# Patient Record
Sex: FEMALE | Race: OTHER | Employment: FULL TIME | URBAN - METROPOLITAN AREA
[De-identification: names, ages, dates, MRNs, and addresses within clinical notes are randomized per-mention and may not be internally consistent; named-entity substitution may affect disease eponyms.]

---

## 2017-01-18 ENCOUNTER — HOSPITAL ENCOUNTER (OUTPATIENT)
Dept: ULTRASOUND IMAGING | Age: 37
Discharge: HOME | End: 2017-01-18
Attending: FAMILY MEDICINE
Payer: COMMERCIAL

## 2017-01-18 DIAGNOSIS — R93.89 ENDOMETRIAL THICKENING ON ULTRA SOUND: ICD-10-CM

## 2017-01-18 PROCEDURE — 76830 TRANSVAGINAL US NON-OB: CPT

## 2018-11-29 PROBLEM — Z23 ENCOUNTER FOR IMMUNIZATION: Status: ACTIVE | Noted: 2018-11-29

## 2019-09-23 PROBLEM — Z23 ENCOUNTER FOR IMMUNIZATION: Status: RESOLVED | Noted: 2018-11-29 | Resolved: 2019-09-23

## 2020-02-08 ENCOUNTER — HOSPITAL ENCOUNTER (OUTPATIENT)
Dept: MAMMOGRAPHY | Age: 40
Discharge: HOME | End: 2020-02-08
Attending: FAMILY MEDICINE
Payer: COMMERCIAL

## 2020-02-08 DIAGNOSIS — Z12.31 SCREENING MAMMOGRAM FOR HIGH-RISK PATIENT: ICD-10-CM

## 2020-02-08 PROCEDURE — 77067 SCR MAMMO BI INCL CAD: CPT

## 2020-08-17 ENCOUNTER — HOSPITAL ENCOUNTER (OUTPATIENT)
Dept: ULTRASOUND IMAGING | Age: 40
Discharge: HOME | End: 2020-08-17
Attending: FAMILY MEDICINE
Payer: COMMERCIAL

## 2020-08-17 ENCOUNTER — HOSPITAL ENCOUNTER (OUTPATIENT)
Dept: NUCLEAR MEDICINE | Age: 40
Discharge: HOME | End: 2020-08-17
Attending: FAMILY MEDICINE
Payer: COMMERCIAL

## 2020-08-17 VITALS — BODY MASS INDEX: 23.62 KG/M2 | WEIGHT: 125 LBS

## 2020-08-17 DIAGNOSIS — R10.13 EPIGASTRIC PAIN: ICD-10-CM

## 2020-08-17 PROCEDURE — 74011250636 HC RX REV CODE- 250/636: Performed by: FAMILY MEDICINE

## 2020-08-17 PROCEDURE — 78227 HEPATOBIL SYST IMAGE W/DRUG: CPT

## 2020-08-17 PROCEDURE — 76700 US EXAM ABDOM COMPLETE: CPT

## 2020-08-17 RX ORDER — SODIUM CHLORIDE 0.9 % (FLUSH) 0.9 %
10 SYRINGE (ML) INJECTION
Status: COMPLETED | OUTPATIENT
Start: 2020-08-17 | End: 2020-08-17

## 2020-08-17 RX ADMIN — SINCALIDE 1.13 MCG: 5 INJECTION, POWDER, LYOPHILIZED, FOR SOLUTION INTRAVENOUS at 12:00

## 2020-08-17 RX ADMIN — Medication 10 ML: at 11:10

## 2021-03-18 ENCOUNTER — TRANSCRIBE ORDER (OUTPATIENT)
Dept: SCHEDULING | Age: 41
End: 2021-03-18

## 2021-03-18 DIAGNOSIS — Z12.31 ENCOUNTER FOR SCREENING MAMMOGRAM FOR MALIGNANT NEOPLASM OF BREAST: Primary | ICD-10-CM

## 2021-03-20 ENCOUNTER — HOSPITAL ENCOUNTER (OUTPATIENT)
Dept: MAMMOGRAPHY | Age: 41
Discharge: HOME | End: 2021-03-20
Attending: FAMILY MEDICINE
Payer: COMMERCIAL

## 2021-03-20 DIAGNOSIS — Z12.31 ENCOUNTER FOR SCREENING MAMMOGRAM FOR MALIGNANT NEOPLASM OF BREAST: ICD-10-CM

## 2021-03-20 PROCEDURE — 77067 SCR MAMMO BI INCL CAD: CPT

## 2022-01-06 ENCOUNTER — HOSPITAL ENCOUNTER (OUTPATIENT)
Dept: ULTRASOUND IMAGING | Age: 42
Discharge: HOME | End: 2022-01-06
Attending: FAMILY MEDICINE
Payer: COMMERCIAL

## 2022-01-06 ENCOUNTER — HOSPITAL ENCOUNTER (OUTPATIENT)
Dept: CT IMAGING | Age: 42
Discharge: HOME | End: 2022-01-06
Attending: FAMILY MEDICINE
Payer: COMMERCIAL

## 2022-01-06 DIAGNOSIS — R31.9 HEMATURIA, UNSPECIFIED TYPE: ICD-10-CM

## 2022-01-06 PROCEDURE — 74176 CT ABD & PELVIS W/O CONTRAST: CPT

## 2022-01-06 PROCEDURE — 76770 US EXAM ABDO BACK WALL COMP: CPT

## 2022-04-26 ENCOUNTER — TRANSCRIBE ORDER (OUTPATIENT)
Dept: SCHEDULING | Age: 42
End: 2022-04-26

## 2022-04-26 DIAGNOSIS — Z12.31 VISIT FOR SCREENING MAMMOGRAM: Primary | ICD-10-CM

## 2022-06-19 DIAGNOSIS — N93.8 OTHER SPECIFIED ABNORMAL UTERINE AND VAGINAL BLEEDING: ICD-10-CM

## 2022-06-20 RX ORDER — NORETHINDRONE ACETATE AND ETHINYL ESTRADIOL AND FERROUS FUMARATE 1MG-20(21)
KIT ORAL
Qty: 84 TABLET | Refills: 3 | OUTPATIENT
Start: 2022-06-20

## 2022-11-22 ENCOUNTER — NURSE ONLY (OUTPATIENT)
Dept: FAMILY MEDICINE CLINIC | Facility: CLINIC | Age: 42
End: 2022-11-22
Payer: COMMERCIAL

## 2022-11-22 DIAGNOSIS — E03.9 ACQUIRED HYPOTHYROIDISM: ICD-10-CM

## 2022-11-22 DIAGNOSIS — E78.00 PURE HYPERCHOLESTEROLEMIA: ICD-10-CM

## 2022-11-22 DIAGNOSIS — E55.9 VITAMIN D DEFICIENCY: ICD-10-CM

## 2022-11-22 DIAGNOSIS — Z00.00 LABORATORY EXAMINATION ORDERED AS PART OF A ROUTINE GENERAL MEDICAL EXAMINATION: Primary | ICD-10-CM

## 2022-11-22 DIAGNOSIS — Z11.59 NEED FOR HEPATITIS C SCREENING TEST: ICD-10-CM

## 2022-11-22 LAB
25(OH)D3 SERPL-MCNC: 18.7 NG/ML (ref 30–100)
ALBUMIN SERPL-MCNC: 3.7 G/DL (ref 3.5–5)
ALBUMIN/GLOB SERPL: 1.3 {RATIO} (ref 0.4–1.6)
ALP SERPL-CCNC: 73 U/L (ref 50–136)
ALT SERPL-CCNC: 25 U/L (ref 12–65)
ANION GAP SERPL CALC-SCNC: 5 MMOL/L (ref 2–11)
AST SERPL-CCNC: 11 U/L (ref 15–37)
BILIRUB SERPL-MCNC: 0.6 MG/DL (ref 0.2–1.1)
BILIRUBIN, URINE, POC: NEGATIVE
BLOOD URINE, POC: ABNORMAL
BUN SERPL-MCNC: 12 MG/DL (ref 6–23)
CALCIUM SERPL-MCNC: 8.7 MG/DL (ref 8.3–10.4)
CHLORIDE SERPL-SCNC: 112 MMOL/L (ref 101–110)
CHOLEST SERPL-MCNC: 150 MG/DL
CO2 SERPL-SCNC: 25 MMOL/L (ref 21–32)
CREAT SERPL-MCNC: 0.8 MG/DL (ref 0.6–1)
GLOBULIN SER CALC-MCNC: 2.9 G/DL (ref 2.8–4.5)
GLUCOSE SERPL-MCNC: 99 MG/DL (ref 65–100)
GLUCOSE URINE, POC: NEGATIVE
GRANS ABSOLUTE, POC: 4.8 K/UL
GRANULOCYTES %, POC: 59.7 %
HCV AB SER QL: NONREACTIVE
HDLC SERPL-MCNC: 55 MG/DL (ref 40–60)
HDLC SERPL: 2.7 {RATIO}
HEMATOCRIT, POC: 44.4 %
HEMOGLOBIN, POC: 14 G/DL
KETONES, URINE, POC: NEGATIVE
LDLC SERPL CALC-MCNC: 75.4 MG/DL
LEUKOCYTE ESTERASE, URINE, POC: NEGATIVE
LYMPHOCYTE %, POC: 35.2 %
LYMPHS ABSOLUTE, POC: 2.9 K/UL
MCH, POC: 32 PG (ref 40–?)
MCHC, POC: 31.5
MCV, POC: 97
MONOCYTE %, POC: 5.1 %
MONOCYTE, ABSOLUTE POC: 0.4 K/UL
MPV, POC: 8.6 FL
NITRITE, URINE, POC: NEGATIVE
PH, URINE, POC: 5.5 (ref 4.6–8)
PLATELET COUNT, POC: 248 K/UL
POTASSIUM SERPL-SCNC: 4.2 MMOL/L (ref 3.5–5.1)
PROT SERPL-MCNC: 6.6 G/DL (ref 6.3–8.2)
PROTEIN,URINE, POC: NEGATIVE
RBC, POC: 4.37 M/UL
RDW, POC: 11.8 %
SODIUM SERPL-SCNC: 142 MMOL/L (ref 133–143)
SPECIFIC GRAVITY, URINE, POC: 1.02 (ref 1–1.03)
TRIGL SERPL-MCNC: 98 MG/DL (ref 35–150)
TSH, 3RD GENERATION: 3.35 UIU/ML (ref 0.36–3.74)
URINALYSIS CLARITY, POC: ABNORMAL
URINALYSIS COLOR, POC: YELLOW
UROBILINOGEN, POC: NORMAL
VLDLC SERPL CALC-MCNC: 19.6 MG/DL (ref 6–23)
WBC, POC: 8.1 K/UL

## 2022-11-22 PROCEDURE — 81002 URINALYSIS NONAUTO W/O SCOPE: CPT | Performed by: FAMILY MEDICINE

## 2022-11-22 PROCEDURE — 85025 COMPLETE CBC W/AUTO DIFF WBC: CPT | Performed by: FAMILY MEDICINE

## 2022-11-23 LAB
HIV 1+2 AB+HIV1 P24 AG SERPL QL IA: NONREACTIVE
HIV 1/2 RESULT COMMENT: NORMAL

## 2022-12-02 ENCOUNTER — OFFICE VISIT (OUTPATIENT)
Dept: FAMILY MEDICINE CLINIC | Facility: CLINIC | Age: 42
End: 2022-12-02
Payer: COMMERCIAL

## 2022-12-02 VITALS
HEIGHT: 61 IN | WEIGHT: 128 LBS | BODY MASS INDEX: 24.17 KG/M2 | SYSTOLIC BLOOD PRESSURE: 120 MMHG | DIASTOLIC BLOOD PRESSURE: 70 MMHG

## 2022-12-02 DIAGNOSIS — Z00.00 ROUTINE GENERAL MEDICAL EXAMINATION AT A HEALTH CARE FACILITY: Primary | ICD-10-CM

## 2022-12-02 DIAGNOSIS — M19.90 ARTHRITIS: ICD-10-CM

## 2022-12-02 DIAGNOSIS — R31.9 HEMATURIA, UNSPECIFIED TYPE: ICD-10-CM

## 2022-12-02 DIAGNOSIS — E03.9 ACQUIRED HYPOTHYROIDISM: ICD-10-CM

## 2022-12-02 DIAGNOSIS — Z12.4 PAP SMEAR FOR CERVICAL CANCER SCREENING: ICD-10-CM

## 2022-12-02 DIAGNOSIS — Z13.31 SCREENING FOR DEPRESSION: ICD-10-CM

## 2022-12-02 DIAGNOSIS — E78.00 PURE HYPERCHOLESTEROLEMIA: ICD-10-CM

## 2022-12-02 LAB
CRP SERPL-MCNC: <0.3 MG/DL (ref 0–0.9)
ERYTHROCYTE [SEDIMENTATION RATE] IN BLOOD: 20 MM/HR (ref 0–20)
URATE SERPL-MCNC: 4.3 MG/DL (ref 2.6–6)

## 2022-12-02 PROCEDURE — 99396 PREV VISIT EST AGE 40-64: CPT | Performed by: FAMILY MEDICINE

## 2022-12-02 RX ORDER — ATORVASTATIN CALCIUM 20 MG/1
20 TABLET, FILM COATED ORAL DAILY
Qty: 90 TABLET | Refills: 3 | Status: SHIPPED | OUTPATIENT
Start: 2022-12-02

## 2022-12-02 RX ORDER — LEVOTHYROXINE SODIUM 0.07 MG/1
75 TABLET ORAL
Qty: 90 TABLET | Refills: 3 | Status: SHIPPED | OUTPATIENT
Start: 2022-12-02

## 2022-12-02 RX ORDER — CELECOXIB 200 MG/1
200 CAPSULE ORAL DAILY
Qty: 90 CAPSULE | Refills: 1 | Status: SHIPPED | OUTPATIENT
Start: 2022-12-02

## 2022-12-02 ASSESSMENT — PATIENT HEALTH QUESTIONNAIRE - PHQ9
SUM OF ALL RESPONSES TO PHQ QUESTIONS 1-9: 0
1. LITTLE INTEREST OR PLEASURE IN DOING THINGS: 0
SUM OF ALL RESPONSES TO PHQ QUESTIONS 1-9: 0
2. FEELING DOWN, DEPRESSED OR HOPELESS: 0
SUM OF ALL RESPONSES TO PHQ9 QUESTIONS 1 & 2: 0

## 2022-12-02 ASSESSMENT — ENCOUNTER SYMPTOMS
SHORTNESS OF BREATH: 0
NAUSEA: 0
VOMITING: 0

## 2022-12-02 NOTE — PROGRESS NOTES
PROGRESS NOTE    SUBJECTIVE:   Amairani Leslie is a 38 y.o. female seen for a follow up visit regarding the following chief complaint:     Chief Complaint   Patient presents with    Annual Exam    Discuss Labs           HPI patient presents to the office today for complete physical complaining that she hurts in every joint in her body and wants to know if she has arthritis also never followed up with urology for her work-up of hematuria      Past Medical History, Past Surgical History, Family history, Social History, and Medications were all reviewed with the patient today and updated as necessary. Current Outpatient Medications   Medication Sig Dispense Refill    atorvastatin (LIPITOR) 20 MG tablet Take 1 tablet by mouth daily 90 tablet 3    levothyroxine (SYNTHROID) 75 MCG tablet Take 1 tablet by mouth every morning (before breakfast) 90 tablet 3    celecoxib (CELEBREX) 200 MG capsule Take 1 capsule by mouth daily With food for pain 90 capsule 1    fluticasone (FLONASE) 50 MCG/ACT nasal spray INHALE 2 SPRAYS INTO EACH NOSTRIL EVERY DAY FOR 30 DAYS      meclizine (ANTIVERT) 25 MG tablet 1 p.o. twice daily as needed vertigo and at at bedtime with vertigo exercises Indications: sensation of spinning or whirling       No current facility-administered medications for this visit.      No Known Allergies  Patient Active Problem List   Diagnosis    Vitamin D deficiency    Smoking    Hyperlipidemia    Environmental allergies     Past Medical History:   Diagnosis Date    Environmental allergies 2013    Hyperlipidemia 3/22/2013    Smoking 2013    Vitamin D deficiency 3/22/2013     Past Surgical History:   Procedure Laterality Date    APPENDECTOMY      BREAST REDUCTION SURGERY      2018     SECTION      IMPLANT BREAST SILICONE/EQ      9332     Family History   Problem Relation Age of Onset    Breast Cancer Maternal Aunt     No Known Problems Father     Cancer Maternal Grandmother breast cancer    Breast Cancer Maternal Grandmother     Cancer Mother         uterus cancer     Social History     Tobacco Use    Smoking status: Former     Packs/day: 0.25     Years: 20.00     Pack years: 5.00     Types: Cigarettes     Start date: 2008     Quit date: 2019     Years since quittin.9     Passive exposure: Past    Smokeless tobacco: Never    Tobacco comments:     Quit smoking: Down to 1 Cig here and there 5.21.18   Substance Use Topics    Alcohol use: No         Review of Systems   Constitutional:  Negative for chills and fever. Respiratory:  Negative for shortness of breath. Cardiovascular:  Negative for chest pain. Gastrointestinal:  Negative for nausea and vomiting. Endocrine: Negative for cold intolerance and heat intolerance. Genitourinary:  Negative for difficulty urinating, frequency, hematuria, menstrual problem, pelvic pain, urgency, vaginal bleeding, vaginal discharge and vaginal pain. Skin:  Negative for rash. Neurological:  Negative for dizziness and headaches. Psychiatric/Behavioral: Negative. OBJECTIVE:  /70 (Site: Left Upper Arm, Position: Sitting, Cuff Size: Small Adult)   Ht 5' 1\" (1.549 m)   Wt 128 lb (58.1 kg)   LMP 2022   BMI 24.19 kg/m²      Physical Exam  Vitals and nursing note reviewed. Exam conducted with a chaperone present. Constitutional:       Appearance: Normal appearance. HENT:      Head: Normocephalic and atraumatic. Right Ear: Tympanic membrane normal.      Left Ear: Tympanic membrane normal.      Nose: Nose normal.      Mouth/Throat:      Mouth: Mucous membranes are moist.      Pharynx: No oropharyngeal exudate or posterior oropharyngeal erythema. Eyes:      Extraocular Movements: Extraocular movements intact. Conjunctiva/sclera: Conjunctivae normal.      Pupils: Pupils are equal, round, and reactive to light. Cardiovascular:      Rate and Rhythm: Normal rate and regular rhythm.       Pulses: Normal pulses. Heart sounds: Normal heart sounds. Pulmonary:      Effort: Pulmonary effort is normal.      Breath sounds: Normal breath sounds. Abdominal:      General: Abdomen is flat. Bowel sounds are normal.      Palpations: Abdomen is soft. Hernia: There is no hernia in the left inguinal area or right inguinal area. Genitourinary:     General: Normal vulva. Urethra: No urethral lesion. Vagina: Normal.      Cervix: Normal.      Uterus: Normal.       Adnexa: Right adnexa normal and left adnexa normal.      Rectum: Normal. Guaiac result negative. No mass. Musculoskeletal:         General: Normal range of motion. Cervical back: Normal range of motion and neck supple. Skin:     General: Skin is warm and dry. Capillary Refill: Capillary refill takes less than 2 seconds. Neurological:      General: No focal deficit present. Mental Status: She is alert and oriented to person, place, and time. Psychiatric:         Mood and Affect: Mood normal.         Behavior: Behavior normal.         Thought Content: Thought content normal.        Medical problems and test results were reviewed with the patient today.      Recent Results (from the past 672 hour(s))   AMB POC KTY COMPLETE CBC    Collection Time: 11/22/22  8:40 AM   Result Value Ref Range    WBC, POC 8.1 K/uL    Lymphocyte % 35.2 %    Monocyte % 5.1 %    Granulocytes %, POC 59.7 %    Lymphs Abs 2.9 K/uL    Monocyte Absolute, POC 0.4 K/uL    Granulocytes Abs 4.8 K/uL    RBC, POC 4.37 M/uL    Hemoglobin, POC 14.0 G/DL    Hematocrit, POC 44.4 %    MCV 97.0     MCH 32.0 (A) 40 pg    MCHC 31.5     RDW, POC 11.8 %    Platelet Count,  K/UL    MPV POC 8.6 fL   AMB POC URINALYSIS DIP STICK MANUAL W/O MICRO    Collection Time: 11/22/22  8:40 AM   Result Value Ref Range    Color (UA POC) Yellow     Clarity (UA POC) Slightly Cloudy     Glucose, Urine, POC Negative Negative    Bilirubin, Urine, POC Negative Negative    Ketones, Urine, POC Negative Negative    Specific Gravity, Urine, POC 1.025 1.001 - 1.035    Blood (UA POC) 2+ Negative    pH, Urine, POC 5.5 4.6 - 8.0    Protein, Urine, POC Negative Negative    Urobilinogen, POC Normal     Nitrite, Urine, POC Negative Negative    Leukocyte Esterase, Urine, POC Negative Negative   Vitamin D 25 Hydroxy    Collection Time: 11/22/22 10:18 AM   Result Value Ref Range    Vit D, 25-Hydroxy 18.7 (L) 30.0 - 100.0 ng/mL   TSH    Collection Time: 11/22/22 10:18 AM   Result Value Ref Range    TSH, 3RD GENERATION 3.350 0.358 - 3.740 uIU/mL   Lipid Panel    Collection Time: 11/22/22 10:18 AM   Result Value Ref Range    Cholesterol, Total 150 <200 MG/DL    Triglycerides 98 35 - 150 MG/DL    HDL 55 40 - 60 MG/DL    LDL Calculated 75.4 <100 MG/DL    VLDL Cholesterol Calculated 19.6 6.0 - 23.0 MG/DL    Chol/HDL Ratio 2.7     Comprehensive Metabolic Panel    Collection Time: 11/22/22 10:18 AM   Result Value Ref Range    Sodium 142 133 - 143 mmol/L    Potassium 4.2 3.5 - 5.1 mmol/L    Chloride 112 (H) 101 - 110 mmol/L    CO2 25 21 - 32 mmol/L    Anion Gap 5 2 - 11 mmol/L    Glucose 99 65 - 100 mg/dL    BUN 12 6 - 23 MG/DL    Creatinine 0.80 0.6 - 1.0 MG/DL    Est, Glom Filt Rate >60 >60 ml/min/1.73m2    Calcium 8.7 8.3 - 10.4 MG/DL    Total Bilirubin 0.6 0.2 - 1.1 MG/DL    ALT 25 12 - 65 U/L    AST 11 (L) 15 - 37 U/L    Alk Phosphatase 73 50 - 136 U/L    Total Protein 6.6 6.3 - 8.2 g/dL    Albumin 3.7 3.5 - 5.0 g/dL    Globulin 2.9 2.8 - 4.5 g/dL    Albumin/Globulin Ratio 1.3 0.4 - 1.6     HIV 1/2 Ag/Ab, 4TH Generation,W Rflx Confirm    Collection Time: 11/22/22 10:18 AM   Result Value Ref Range    HIV 1/2 Interp NONREACTIVE NONREACTIVE      HIV 1/2 Result Comment SEE NOTE     Hepatitis C Antibody    Collection Time: 11/22/22 10:18 AM   Result Value Ref Range    Hepatitis C Ab NONREACTIVE NONREACTIVE         ASSESSMENT and PLAN    Visit Diagnoses and Associated Orders       Routine general medical examination at a health care facility    -  Primary         Pap smear for cervical cancer screening        PAP IG, CT-NG, rfx Aptima HPV ASCUS (291115, 107736) [[id number] Custom]   - Future Order    PAP IG, CT-NG, rfx Aptima HPV ASCUS ([id number], [id number]) [[id number] Custom]           Pure hypercholesterolemia        atorvastatin (LIPITOR) 20 MG tablet [72755]           Acquired hypothyroidism        levothyroxine (SYNTHROID) 75 MCG tablet [4422]           Hematuria, unspecified type        [facility], [place] [[id number] Custom]           Arthritis        Sedimentation Rate [11239 Custom]   - Future Order    CCP Antibodies, IGG/IGA [33883 Custom]   - Future Order    ALESSANDRA, Direct, w/Reflex [30163 CPT(R)]   - Future Order    C-Reactive Protein [88594 Custom]   - Future Order    Uric Acid [00156 Custom]   - Future Order    Rheumatoid Factor [47996 Custom]   - Future Order    celecoxib (CELEBREX) 200 MG capsule [06747]      ALESSANDRA, Direct, w/Reflex [66941 CPT(R)]      CCP Antibodies, IGG/IGA [42908 Custom]      Rheumatoid Factor [20065 Custom]      Uric Acid [02552 Custom]      C-Reactive Protein [93219 Custom]      Sedimentation Rate [92392 Custom]           Screening for depression                         Diagnosis Orders   1. Routine general medical examination at a health care facility        2. Pap smear for cervical cancer screening  PAP IG, CT-NG, rfx Aptima HPV ASCUS ([id number], 264611)    PAP IG, CT-NG, rfx Aptima HPV ASCUS (888667, 679563)      3. Pure hypercholesterolemia  atorvastatin (LIPITOR) 20 MG tablet      4. Acquired hypothyroidism  levothyroxine (SYNTHROID) 75 MCG tablet      5. Hematuria, unspecified type  [facility], [place]      6.  Arthritis  Sedimentation Rate    CCP Antibodies, IGG/IGA    ALESSANDRA, Direct, w/Reflex    C-Reactive Protein    Uric Acid    Rheumatoid Factor    celecoxib (CELEBREX) 200 MG capsule    ALESSANDRA, Direct, w/Reflex    CCP Antibodies, IGG/IGA    Rheumatoid Factor    Uric Acid    C-Reactive Protein    Sedimentation Rate      7. Screening for depression        , Anam Powell was seen today for annual exam and discuss labs. Diagnoses and all orders for this visit:    Routine general medical examination at a health care facility    Pap smear for cervical cancer screening  -     PAP IG, CT-NG, rfx Aptima HPV ASCUS ([id number], [id number]); Future  -     PAP IG, CT-NG, rfx Aptima HPV ASCUS ([id number], [id number])    Pure hypercholesterolemia  -     atorvastatin (LIPITOR) 20 MG tablet; Take 1 tablet by mouth daily    Acquired hypothyroidism  -     levothyroxine (SYNTHROID) 75 MCG tablet; Take 1 tablet by mouth every morning (before breakfast)    Hematuria, unspecified type  -     [facility]    Arthritis  -     Sedimentation Rate; Future  -     CCP Antibodies, IGG/IGA; Future  -     ALESSANDRA, Direct, w/Reflex; Future  -     C-Reactive Protein; Future  -     Uric Acid; Future  -     Rheumatoid Factor; Future  -     celecoxib (CELEBREX) 200 MG capsule; Take 1 capsule by mouth daily With food for pain  -     ALESSANDRA, Direct, w/Reflex  -     CCP Antibodies, IGG/IGA  -     Rheumatoid Factor  -     Uric Acid  -     C-Reactive Protein  -     Sedimentation Rate    Screening for depression    , We will check an arthritis panel in the meantime start her on Celebrex we will refer her back to urology for finished work-up of hematuria reviewed the rest of her labs answered all her questions counseling and supportive care was normal physical exam we will call her back with the results of her Pap if they are abnormal.I have spent a total of 8-15 minutes assessing, reviewing, and discussing the depression screening with patient in office today.

## 2022-12-08 ENCOUNTER — TELEMEDICINE (OUTPATIENT)
Dept: FAMILY MEDICINE CLINIC | Facility: CLINIC | Age: 42
End: 2022-12-08
Payer: COMMERCIAL

## 2022-12-08 DIAGNOSIS — M19.90 ARTHRITIS: Primary | ICD-10-CM

## 2022-12-08 PROCEDURE — 99442 PR PHYS/QHP TELEPHONE EVALUATION 11-20 MIN: CPT | Performed by: FAMILY MEDICINE

## 2022-12-08 ASSESSMENT — PATIENT HEALTH QUESTIONNAIRE - PHQ9
SUM OF ALL RESPONSES TO PHQ QUESTIONS 1-9: 0
SUM OF ALL RESPONSES TO PHQ QUESTIONS 1-9: 0
1. LITTLE INTEREST OR PLEASURE IN DOING THINGS: 0
SUM OF ALL RESPONSES TO PHQ9 QUESTIONS 1 & 2: 0
SUM OF ALL RESPONSES TO PHQ QUESTIONS 1-9: 0
2. FEELING DOWN, DEPRESSED OR HOPELESS: 0
SUM OF ALL RESPONSES TO PHQ QUESTIONS 1-9: 0

## 2022-12-08 ASSESSMENT — ENCOUNTER SYMPTOMS
SHORTNESS OF BREATH: 0
NAUSEA: 0
VOMITING: 0

## 2022-12-08 NOTE — PROGRESS NOTES
PROGRESS NOTE    SUBJECTIVE: This visit was conducted via the phone with patient's consent to the visit and all associated charges to reduce the patient's risk of exposure to COVID-19 and continuity of care for an established patient. She and/or her healthcare decision maker is aware that this patient-initiated phone encounter is a billable service, with coverage as determined by her insurance carrier. She is aware that she may receive a bill and has provided verbal consent to proceed: Yes    Vitals and physical exam deferred due to telephone visit. Total Time: minutes: 11-20 minutes. Severiano Thomas is a 38 y.o. female seen for a follow up visit regarding the following chief complaint:           HPI is doing a phone call visit to go over her arthritis panel states that Celebrex is helping      Past Medical History, Past Surgical History, Family history, Social History, and Medications were all reviewed with the patient today and updated as necessary. Current Outpatient Medications   Medication Sig Dispense Refill    atorvastatin (LIPITOR) 20 MG tablet Take 1 tablet by mouth daily 90 tablet 3    levothyroxine (SYNTHROID) 75 MCG tablet Take 1 tablet by mouth every morning (before breakfast) 90 tablet 3    celecoxib (CELEBREX) 200 MG capsule Take 1 capsule by mouth daily With food for pain 90 capsule 1    fluticasone (FLONASE) 50 MCG/ACT nasal spray INHALE 2 SPRAYS INTO EACH NOSTRIL EVERY DAY FOR 30 DAYS      meclizine (ANTIVERT) 25 MG tablet 1 p.o. twice daily as needed vertigo and at at bedtime with vertigo exercises Indications: sensation of spinning or whirling       No current facility-administered medications for this visit.      No Known Allergies  Patient Active Problem List   Diagnosis    Vitamin D deficiency    Smoking    Hyperlipidemia    Environmental allergies     Past Medical History:   Diagnosis Date    Environmental allergies 2/28/2013    Hyperlipidemia 3/22/2013    Smoking 2/28/2013 Vitamin D deficiency 3/22/2013     Past Surgical History:   Procedure Laterality Date    APPENDECTOMY      BREAST REDUCTION SURGERY      2018     SECTION      IMPLANT BREAST SILICONE/EQ      9566     Family History   Problem Relation Age of Onset    Breast Cancer Maternal Aunt     No Known Problems Father     Cancer Maternal Grandmother         breast cancer    Breast Cancer Maternal Grandmother     Cancer Mother         uterus cancer     Social History     Tobacco Use    Smoking status: Former     Packs/day: 0.25     Years: 20.00     Pack years: 5.00     Types: Cigarettes     Start date: 2008     Quit date: 2019     Years since quittin.9     Passive exposure: Past    Smokeless tobacco: Never    Tobacco comments:     Quit smoking: Down to 1 Cig here and there 5.21.18   Substance Use Topics    Alcohol use: No         Review of Systems   Constitutional:  Negative for fatigue and fever. Respiratory:  Negative for shortness of breath. Cardiovascular:  Negative for chest pain. Gastrointestinal:  Negative for nausea and vomiting. OBJECTIVE:  LMP 2022      Physical Exam     Medical problems and test results were reviewed with the patient today.      Recent Results (from the past 672 hour(s))   AMB POC KTY COMPLETE CBC    Collection Time: 22  8:40 AM   Result Value Ref Range    WBC, POC 8.1 K/uL    Lymphocyte % 35.2 %    Monocyte % 5.1 %    Granulocytes %, POC 59.7 %    Lymphs Abs 2.9 K/uL    Monocyte Absolute, POC 0.4 K/uL    Granulocytes Abs 4.8 K/uL    RBC, POC 4.37 M/uL    Hemoglobin, POC 14.0 G/DL    Hematocrit, POC 44.4 %    MCV 97.0     MCH 32.0 (A) 40 pg    MCHC 31.5     RDW, POC 11.8 %    Platelet Count,  K/UL    MPV POC 8.6 fL   AMB POC URINALYSIS DIP STICK MANUAL W/O MICRO    Collection Time: 22  8:40 AM   Result Value Ref Range    Color (UA POC) Yellow     Clarity (UA POC) Slightly Cloudy     Glucose, Urine, POC Negative Negative Bilirubin, Urine, POC Negative Negative    Ketones, Urine, POC Negative Negative    Specific Gravity, Urine, POC 1.025 1.001 - 1.035    Blood (UA POC) 2+ Negative    pH, Urine, POC 5.5 4.6 - 8.0    Protein, Urine, POC Negative Negative    Urobilinogen, POC Normal     Nitrite, Urine, POC Negative Negative    Leukocyte Esterase, Urine, POC Negative Negative   Vitamin D 25 Hydroxy    Collection Time: 11/22/22 10:18 AM   Result Value Ref Range    Vit D, 25-Hydroxy 18.7 (L) 30.0 - 100.0 ng/mL   TSH    Collection Time: 11/22/22 10:18 AM   Result Value Ref Range    TSH, 3RD GENERATION 3.350 0.358 - 3.740 uIU/mL   Lipid Panel    Collection Time: 11/22/22 10:18 AM   Result Value Ref Range    Cholesterol, Total 150 <200 MG/DL    Triglycerides 98 35 - 150 MG/DL    HDL 55 40 - 60 MG/DL    LDL Calculated 75.4 <100 MG/DL    VLDL Cholesterol Calculated 19.6 6.0 - 23.0 MG/DL    Chol/HDL Ratio 2.7     Comprehensive Metabolic Panel    Collection Time: 11/22/22 10:18 AM   Result Value Ref Range    Sodium 142 133 - 143 mmol/L    Potassium 4.2 3.5 - 5.1 mmol/L    Chloride 112 (H) 101 - 110 mmol/L    CO2 25 21 - 32 mmol/L    Anion Gap 5 2 - 11 mmol/L    Glucose 99 65 - 100 mg/dL    BUN 12 6 - 23 MG/DL    Creatinine 0.80 0.6 - 1.0 MG/DL    Est, Glom Filt Rate >60 >60 ml/min/1.73m2    Calcium 8.7 8.3 - 10.4 MG/DL    Total Bilirubin 0.6 0.2 - 1.1 MG/DL    ALT 25 12 - 65 U/L    AST 11 (L) 15 - 37 U/L    Alk Phosphatase 73 50 - 136 U/L    Total Protein 6.6 6.3 - 8.2 g/dL    Albumin 3.7 3.5 - 5.0 g/dL    Globulin 2.9 2.8 - 4.5 g/dL    Albumin/Globulin Ratio 1.3 0.4 - 1.6     HIV 1/2 Ag/Ab, 4TH Generation,W Rflx Confirm    Collection Time: 11/22/22 10:18 AM   Result Value Ref Range    HIV 1/2 Interp NONREACTIVE NONREACTIVE      HIV 1/2 Result Comment SEE NOTE     Hepatitis C Antibody    Collection Time: 11/22/22 10:18 AM   Result Value Ref Range    Hepatitis C Ab NONREACTIVE NONREACTIVE     PAP IG, CT-NG, rfx Aptima HPV ASCUS ([id number], [id number])

## 2022-12-27 ENCOUNTER — TRANSCRIBE ORDERS (OUTPATIENT)
Dept: SCHEDULING | Age: 42
End: 2022-12-27

## 2022-12-27 DIAGNOSIS — Z12.31 SCREENING MAMMOGRAM FOR HIGH-RISK PATIENT: Primary | ICD-10-CM

## 2022-12-28 ENCOUNTER — HOSPITAL ENCOUNTER (OUTPATIENT)
Dept: MAMMOGRAPHY | Age: 42
Discharge: HOME | End: 2022-12-31
Payer: COMMERCIAL

## 2022-12-28 DIAGNOSIS — Z12.31 SCREENING MAMMOGRAM FOR HIGH-RISK PATIENT: ICD-10-CM

## 2022-12-28 PROCEDURE — 77067 SCR MAMMO BI INCL CAD: CPT

## 2023-01-17 ENCOUNTER — HOSPITAL ENCOUNTER (OUTPATIENT)
Dept: MAMMOGRAPHY | Age: 43
Discharge: HOME | End: 2023-01-20
Payer: COMMERCIAL

## 2023-01-17 DIAGNOSIS — R92.8 ABNORMAL MAMMOGRAM: ICD-10-CM

## 2023-01-17 PROCEDURE — 76642 ULTRASOUND BREAST LIMITED: CPT

## 2023-01-17 PROCEDURE — 77065 DX MAMMO INCL CAD UNI: CPT

## 2023-01-30 ENCOUNTER — OFFICE VISIT (OUTPATIENT)
Dept: UROLOGY | Age: 43
End: 2023-01-30
Payer: COMMERCIAL

## 2023-01-30 DIAGNOSIS — Q63.1 HORSESHOE KIDNEY: ICD-10-CM

## 2023-01-30 DIAGNOSIS — R31.29 MICROHEMATURIA: Primary | ICD-10-CM

## 2023-01-30 DIAGNOSIS — R30.0 DYSURIA: ICD-10-CM

## 2023-01-30 LAB
BILIRUBIN, URINE, POC: NEGATIVE
BLOOD URINE, POC: ABNORMAL
GLUCOSE URINE, POC: NEGATIVE
KETONES, URINE, POC: NEGATIVE
LEUKOCYTE ESTERASE, URINE, POC: NEGATIVE
NITRITE, URINE, POC: NEGATIVE
PH, URINE, POC: 6 (ref 4.6–8)
PROTEIN,URINE, POC: NEGATIVE
SPECIFIC GRAVITY, URINE, POC: 1.01 (ref 1–1.03)
URINALYSIS CLARITY, POC: ABNORMAL
URINALYSIS COLOR, POC: ABNORMAL
UROBILINOGEN, POC: ABNORMAL

## 2023-01-30 PROCEDURE — 81003 URINALYSIS AUTO W/O SCOPE: CPT | Performed by: NURSE PRACTITIONER

## 2023-01-30 PROCEDURE — 99214 OFFICE O/P EST MOD 30 MIN: CPT | Performed by: NURSE PRACTITIONER

## 2023-01-30 ASSESSMENT — ENCOUNTER SYMPTOMS
EYES NEGATIVE: 1
BACK PAIN: 1
RESPIRATORY NEGATIVE: 1

## 2023-01-30 NOTE — PROGRESS NOTES
[facility]  [address] [place] [address] 109, [address] [facility]  [phone]          Yeimi Saleem  : 1980    Chief Complaint   Patient presents with    New Patient    Hematuria          HPI     Yeimi Saleem is a 38 y.o. female who is referred to clinic for microscopic hematuria evaluation 22. Seen by Dr Soni Mirza. She denies gross hematuria but has had microscopic hematuria on at least 2 U/As. 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 No bothersome urgency, frequency, retention, urinary incontinence, other symptoms/concerns. CT  A/P without contrast and REnal / Bladder US  shows horseshoe kidney but no obvious source of hematuria. She was due to have cystoscopy but this was not completed. Back today for follow up. She does report occ dysuria. Drinks little water. Past Medical History:   Diagnosis Date    Environmental allergies 2013    Hyperlipidemia 3/22/2013    Smoking 2013    Vitamin D deficiency 3/22/2013     Past Surgical History:   Procedure Laterality Date    APPENDECTOMY      BREAST REDUCTION SURGERY      2018     SECTION      IMPLANT BREAST SILICONE/EQ      1884     Current Outpatient Medications   Medication Sig Dispense Refill    atorvastatin (LIPITOR) 20 MG tablet Take 1 tablet by mouth daily 90 tablet 3    levothyroxine (SYNTHROID) 75 MCG tablet Take 1 tablet by mouth every morning (before breakfast) 90 tablet 3    celecoxib (CELEBREX) 200 MG capsule Take 1 capsule by mouth daily With food for pain 90 capsule 1    fluticasone (FLONASE) 50 MCG/ACT nasal spray INHALE 2 SPRAYS INTO EACH NOSTRIL EVERY DAY FOR 30 DAYS      meclizine (ANTIVERT) 25 MG tablet 1 p.o. twice daily as needed vertigo and at at bedtime with vertigo exercises Indications: sensation of spinning or whirling       No current facility-administered medications for this visit.      No Known Allergies  Social History Socioeconomic History    Marital status:      Spouse name: Not on file    Number of children: Not on file    Years of education: Not on file    Highest education level: Not on file   Occupational History    Not on file   Tobacco Use    Smoking status: Former     Packs/day: 0.25     Years: 20.00     Pack years: 5.00     Types: Cigarettes     Start date: 12/31/2008     Quit date: 12/31/2019     Years since quitting: 3.0     Passive exposure: Past    Smokeless tobacco: Never    Tobacco comments:     Quit smoking: Down to 1 Cig here and there 5.21.18   Vaping Use    Vaping Use: Never used   Substance and Sexual Activity    Alcohol use: No    Drug use: No    Sexual activity: Not on file   Other Topics Concern    Not on file   Social History Narrative    Not on file     Social Determinants of Health     Financial Resource Strain: Not on file   Food Insecurity: Not on file   Transportation Needs: Not on file   Physical Activity: Not on file   Stress: Not on file   Social Connections: Not on file   Intimate Partner Violence: Not on file   Housing Stability: Not on file     Family History   Problem Relation Age of Onset    Cancer Mother         uterus cancer    No Known Problems Father     Cancer Maternal Grandmother         breast cancer    Breast Cancer Maternal Grandmother 79       Review of Systems  Constitutional: Positive for malaise/fatigue. Skin: Negative skin ROS  Eyes: Eyes negative  ENT: HENT negative  Respiratory: Respiratory negative  Cardiovascular: Positive for irregular heartbeat (NOT VERY OFTEN). GI: Neg GI ROS  Genitourinary: Positive for urinary burning, hematuria, flank pain, recurrent UTIs, urgency and frequent urination. Number of pregnancies: 1. Number of births: 1. Musculoskeletal: Positive for back pain, arthralgias, tenderness, muscle weakness and neck pain. Neurological: Positive for numbness.   Psychological: Neg psych ROS  Endocrine: Positive for fatigue and heat intolerance. Hem/Lymphatic: Hematologic/lymphatic negative    Urinalysis  UA - Dipstick  Results for orders placed or performed in visit on 01/30/23   AMB POC URINALYSIS DIP STICK AUTO W/O MICRO   Result Value Ref Range    Color (UA POC)      Clarity (UA POC)      Glucose, Urine, POC Negative Negative    Bilirubin, Urine, POC Negative Negative    KETONES, Urine, POC Negative Negative    Specific Gravity, Urine, POC 1.010 1.001 - 1.035    Blood (UA POC) Small Negative    pH, Urine, POC 6.0 4.6 - 8.0    Protein, Urine, POC Negative Negative    Urobilinogen, POC 2 mg/dL     Nitrite, Urine, POC Negative Negative    Leukocyte Esterase, Urine, POC Negative Negative       UA - Micro  WBC - 0  RBC - 0-5  Bacteria - 0  Epith - 0    PHYSICAL EXAM    General appearance - well appearing and in no distress  Mental status - alert, oriented to person, place, and time  Neck - supple, no significant adenopathy  Chest/Lung-  Quiet, even and easy respiratory effort without use of accessory muscles  Skin - normal coloration and turgor, no rashes      Assessment and Plan    ICD-10-CM    1. Microhematuria  R31.29 AMB POC URINALYSIS DIP STICK AUTO W/O MICRO      2. Horseshoe kidney  Q63.1       3. Dysuria  R30.0           Microhematuria- CT negative. Urine w sign hematuria. Will have cysto in office to complete work up. Risks, benefits, and alternatives reviewed. Horseshoe kidney- pathophysiology reviewed. No add work up needed. Dysuria- urine wo infection today. HOLD on antibiotic or other tx. Advised to call this office if develops sx of UTI for specimen check. If unable to come to this office, encouraged to have UC performed and records sent to this office. I have educated patient to behavioral changes that can decrease the frequency of any urinary infection.   These include eliminating constipation, front to back wiping, frequent voiding to keep bladder volumes low, double voiding, avoid soaking in water (including baths, pools, hot tubs), use of cranberry products, and use of probiotics. I encouraged consuming minimum of 64 oz of water daily. I also recommend avoiding consumption of sugary beverages and other known bladder irritants. SARA Rubio, APRN - CNP  Dr. Catrachita Rojas is supervising physician today and he approves plan of care.

## 2023-02-01 ENCOUNTER — PROCEDURE VISIT (OUTPATIENT)
Dept: UROLOGY | Age: 43
End: 2023-02-01
Payer: COMMERCIAL

## 2023-02-01 DIAGNOSIS — R31.29 MICROHEMATURIA: Primary | ICD-10-CM

## 2023-02-01 LAB
BILIRUBIN, URINE, POC: NEGATIVE
BLOOD URINE, POC: NORMAL
GLUCOSE URINE, POC: NEGATIVE
KETONES, URINE, POC: NEGATIVE
LEUKOCYTE ESTERASE, URINE, POC: NEGATIVE
NITRITE, URINE, POC: NEGATIVE
PH, URINE, POC: 6.5 (ref 4.6–8)
PROTEIN,URINE, POC: NEGATIVE
SPECIFIC GRAVITY, URINE, POC: 1.02 (ref 1–1.03)
URINALYSIS CLARITY, POC: NORMAL
URINALYSIS COLOR, POC: NORMAL
UROBILINOGEN, POC: NORMAL

## 2023-02-01 PROCEDURE — 81003 URINALYSIS AUTO W/O SCOPE: CPT | Performed by: UROLOGY

## 2023-02-01 PROCEDURE — 52000 CYSTOURETHROSCOPY: CPT | Performed by: UROLOGY

## 2023-02-01 NOTE — LETTER
[facility]  [address] [place] [id number]  Phone: [phone]  Fax: [phone]    Juan Jolly MD    February 1, 2023     Brian Monaco DO  [address] [place] [id number]    Patient: Jennifer Nbole   MR Number: [id number]   YOB: 1980   Date of Visit: 2/1/2023       Dear Brian Monaco:    Thank you for referring Feli Crandall to me for evaluation/treatment. Below are the relevant portions of my assessment and plan of care. If you have questions, please do not hesitate to call me. I look forward to following [facility] along with you.     Sincerely,      Juan Jolly MD

## 2023-02-01 NOTE — PROGRESS NOTES
[facility]  [address]   [address]  [facility], [address] [facility]  [phone]    Mack Aleman  : 1980    HPI   38 y.o.  female who presents for cystoscopy for microscopic hematuria evaluation. She denies gross hematuria but has had microscopic hematuria on at least 2 U/As. 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 No bothersome urgency, frequency, retention, urinary incontinence, other symptoms/concerns. CT  A/P without contrast and REnal / Bladder US  shows horseshoe kidney but no obvious source of hematuria. Imaging personally reviewed by me. Has not had cysto. Past Medical History:   Diagnosis Date    Environmental allergies 2013    Hyperlipidemia 3/22/2013    Smoking 2013    Vitamin D deficiency 3/22/2013     Past Surgical History:   Procedure Laterality Date    APPENDECTOMY      BREAST REDUCTION SURGERY      2018     SECTION      IMPLANT BREAST SILICONE/EQ      7477     Current Outpatient Medications   Medication Sig Dispense Refill    atorvastatin (LIPITOR) 20 MG tablet Take 1 tablet by mouth daily 90 tablet 3    levothyroxine (SYNTHROID) 75 MCG tablet Take 1 tablet by mouth every morning (before breakfast) 90 tablet 3    celecoxib (CELEBREX) 200 MG capsule Take 1 capsule by mouth daily With food for pain 90 capsule 1    fluticasone (FLONASE) 50 MCG/ACT nasal spray INHALE 2 SPRAYS INTO EACH NOSTRIL EVERY DAY FOR 30 DAYS      meclizine (ANTIVERT) 25 MG tablet 1 p.o. twice daily as needed vertigo and at at bedtime with vertigo exercises Indications: sensation of spinning or whirling       No current facility-administered medications for this visit.      No Known Allergies  Social History     Socioeconomic History    Marital status:      Spouse name: Not on file    Number of children: Not on file    Years of education: Not on file    Highest education level: Not on file   Occupational History    Not on file   Tobacco Use    Smoking status: Former     Packs/day: 0.25     Years: 20.00     Pack years: 5.00     Types: Cigarettes     Start date: 12/31/2008     Quit date: 12/31/2019     Years since quitting: 3.0     Passive exposure: Past    Smokeless tobacco: Never    Tobacco comments:     Quit smoking: Down to 1 Cig here and there 5.21.18   Vaping Use    Vaping Use: Never used   Substance and Sexual Activity    Alcohol use: No    Drug use: No    Sexual activity: Not on file   Other Topics Concern    Not on file   Social History Narrative    Not on file     Social Determinants of Health     Financial Resource Strain: Not on file   Food Insecurity: Not on file   Transportation Needs: Not on file   Physical Activity: Not on file   Stress: Not on file   Social Connections: Not on file   Intimate Partner Violence: Not on file   Housing Stability: Not on file     Family History   Problem Relation Age of Onset    Cancer Mother         uterus cancer    No Known Problems Father     Cancer Maternal Grandmother         breast cancer    Breast Cancer Maternal Grandmother 79       There were no vitals taken for this visit. UA - Dipstick  Results for orders placed or performed in visit on 02/01/23   AMB POC URINALYSIS DIP STICK AUTO W/O MICRO   Result Value Ref Range    Color (UA POC)      Clarity (UA POC)      Glucose, Urine, POC Negative Negative    Bilirubin, Urine, POC Negative Negative    KETONES, Urine, POC Negative Negative    Specific Gravity, Urine, POC 1.025 1.001 - 1.035    Blood (UA POC) Small Negative    pH, Urine, POC 6.5 4.6 - 8.0    Protein, Urine, POC Negative Negative    Urobilinogen, POC 0.2 mg/dL     Nitrite, Urine, POC Negative Negative    Leukocyte Esterase, Urine, POC Negative Negative       UA - Micro  WBC - 0  RBC - 0  Bacteria - 0  Epith - 0    There were no vitals taken for this visit.      GENERAL: No acute distress, Awake, Alert, Oriented X 3, Gait normal  CARDIAC: regular rate and rhythm  CHEST AND LUNG: Easy work of breathing, clear to auscultation bilaterally, no cyanosis  ABDOMEN: soft, non tender, non-distended, positive bowel sounds, no organomegaly, no palpable masses, no guarding, no rebound tenderness  SKIN: No rash, no erythema, no lacerations or abrasions, no ecchymosis  NEUROLOGIC: cranial nerves 2-12 grossly intact       Cystoscopy Procedure:     Procedure Room  1  Scope ID:       dispo  Assistant:      kana    All risks, benefits and alternatives were again reviewed with patient and she is willing to proceed at this time. Her genital area was prepped and draped and a sterile field applied. 2% lidocaine jelly was injected in the the urethra and allowed to dwell for several minutes. A flexible cystoscope was then inserted into the urethral meatus and advanced under direct vision. The urethra appeared normal with no obstructions. The bladder neck was open without scarring, contractures, frons or tumors present. The bladder was systematically surveyed. No bladder trabeculations were seen. NO bladder tumors seen. Does have mild trigonitis. The ureteral orifices were seen in their normal orthotopic position. The cystoscope was then removed under direct vision. The patient tolerated the procedure well. Assessment and Plan    ICD-10-CM    1. Microhematuria  R31.29 CYSTOURETHROSCOPY     AMB POC URINALYSIS DIP STICK AUTO W/O MICRO        Orders Placed This Encounter   Procedures    CYSTOURETHROSCOPY    AMB POC URINALYSIS DIP STICK AUTO W/O MICRO     Cysto today shows mild inflammation at trigone which is common. She has no bothersome LUTS. CT / renal US 2022 showed no upper tract source of hematuria. Patient cleared from microhematuria work up standpoint. Follow up PRN. If gross hematuria develops or micro hematuria persists after 5 years, then recommend repeat referral for another evaluation. Juanpablo Costa M.D.     [facility]  [facility]  [address] [facility], [address] [facility]  Phone: [phone]  Fax: [phone]

## 2023-05-29 DIAGNOSIS — M19.90 ARTHRITIS: ICD-10-CM

## 2023-05-30 RX ORDER — CELECOXIB 200 MG/1
200 CAPSULE ORAL DAILY
Qty: 90 CAPSULE | Refills: 1 | Status: SHIPPED | OUTPATIENT
Start: 2023-05-30

## 2023-12-07 DIAGNOSIS — M19.90 ARTHRITIS: ICD-10-CM

## 2023-12-07 DIAGNOSIS — E78.00 PURE HYPERCHOLESTEROLEMIA: ICD-10-CM

## 2023-12-08 RX ORDER — CELECOXIB 200 MG/1
200 CAPSULE ORAL DAILY
Qty: 30 CAPSULE | Refills: 0 | Status: SHIPPED | OUTPATIENT
Start: 2023-12-08

## 2023-12-08 RX ORDER — ATORVASTATIN CALCIUM 20 MG/1
20 TABLET, FILM COATED ORAL DAILY
Qty: 30 TABLET | Refills: 0 | Status: SHIPPED | OUTPATIENT
Start: 2023-12-08

## 2023-12-27 ENCOUNTER — TELEPHONE (OUTPATIENT)
Dept: FAMILY MEDICINE CLINIC | Facility: CLINIC | Age: 43
End: 2023-12-27

## 2024-01-02 DIAGNOSIS — E03.9 ACQUIRED HYPOTHYROIDISM: ICD-10-CM

## 2024-01-02 RX ORDER — LEVOTHYROXINE SODIUM 0.07 MG/1
75 TABLET ORAL
Qty: 90 TABLET | Refills: 3 | OUTPATIENT
Start: 2024-01-02

## 2024-01-07 DIAGNOSIS — M19.90 ARTHRITIS: ICD-10-CM

## 2024-01-07 DIAGNOSIS — E78.00 PURE HYPERCHOLESTEROLEMIA: ICD-10-CM

## 2024-01-08 RX ORDER — CELECOXIB 200 MG/1
200 CAPSULE ORAL DAILY
Qty: 30 CAPSULE | Refills: 0 | OUTPATIENT
Start: 2024-01-08

## 2024-01-08 RX ORDER — ATORVASTATIN CALCIUM 20 MG/1
20 TABLET, FILM COATED ORAL DAILY
Qty: 30 TABLET | Refills: 0 | OUTPATIENT
Start: 2024-01-08

## 2024-10-05 ENCOUNTER — HOSPITAL ENCOUNTER (OUTPATIENT)
Dept: MAMMOGRAPHY | Age: 44
Discharge: HOME | End: 2024-10-08
Payer: COMMERCIAL

## 2024-10-05 DIAGNOSIS — Z12.31 ENCOUNTER FOR SCREENING MAMMOGRAM FOR BREAST CANCER: ICD-10-CM

## 2024-10-05 PROCEDURE — 77063 BREAST TOMOSYNTHESIS BI: CPT
